# Patient Record
Sex: FEMALE | NOT HISPANIC OR LATINO
[De-identification: names, ages, dates, MRNs, and addresses within clinical notes are randomized per-mention and may not be internally consistent; named-entity substitution may affect disease eponyms.]

---

## 2024-09-04 ENCOUNTER — DASHBOARD ENCOUNTERS (OUTPATIENT)
Age: 34
End: 2024-09-04

## 2024-09-04 ENCOUNTER — LAB OUTSIDE AN ENCOUNTER (OUTPATIENT)
Dept: URBAN - METROPOLITAN AREA CLINIC 113 | Facility: CLINIC | Age: 34
End: 2024-09-04

## 2024-09-04 ENCOUNTER — OFFICE VISIT (OUTPATIENT)
Dept: URBAN - METROPOLITAN AREA CLINIC 113 | Facility: CLINIC | Age: 34
End: 2024-09-04
Payer: COMMERCIAL

## 2024-09-04 VITALS
BODY MASS INDEX: 26.06 KG/M2 | DIASTOLIC BLOOD PRESSURE: 74 MMHG | HEART RATE: 78 BPM | RESPIRATION RATE: 18 BRPM | WEIGHT: 138 LBS | TEMPERATURE: 99.3 F | HEIGHT: 61 IN | SYSTOLIC BLOOD PRESSURE: 111 MMHG

## 2024-09-04 DIAGNOSIS — R10.84 ABDOMINAL PAIN, GENERALIZED: ICD-10-CM

## 2024-09-04 DIAGNOSIS — R93.89 ABNORMAL CT SCAN: ICD-10-CM

## 2024-09-04 DIAGNOSIS — K63.89 OTHER SPECIFIED DISEASES OF INTESTINE: ICD-10-CM

## 2024-09-04 DIAGNOSIS — R19.5 LOOSE STOOLS: ICD-10-CM

## 2024-09-04 PROBLEM — 129679001: Status: ACTIVE | Noted: 2024-09-04

## 2024-09-04 PROCEDURE — 99204 OFFICE O/P NEW MOD 45 MIN: CPT

## 2024-09-04 NOTE — HPI-TODAY'S VISIT:
Ms. Pulido is a 34-year-old woman presenting for evaluation of abdominal pain.  She had a ER visit on 8/17/2024 to [facility] [place] that she described as sharp, burning left lower quadrant pain was like once diarrhea.  She underwent a CT abdomen pelvis without contrast which showed mild colonic wall thickening in the sigmoid colon and rectum suspicious for colitis.  Her labs available for review from this visit showed prior blood cell count 8.23, hemoglobin 12.4 298,000, normal LFTs she was given IV fluids, Dilaudid, Zofran and a dose of Augmentin in the emergency department for treatment of colitis.  Today she reports she completed her course of antibiotics on 8/26. She has never had a colonoscopy. Reports family history of UC in grandfather. Denies family history of colon cancer. Denies hematochezia or melena. She reports she has had these "episodes" for 2 years. Denies unintentional weight loss. She reports she stopped smoking recently. She reports a few months ago she began vomiting for 10 days this has resolved. Denies abdominal pain  today.

## 2024-10-03 ENCOUNTER — OFFICE VISIT (OUTPATIENT)
Dept: URBAN - METROPOLITAN AREA SURGERY CENTER 25 | Facility: SURGERY CENTER | Age: 34
End: 2024-10-03

## 2024-11-01 ENCOUNTER — OFFICE VISIT (OUTPATIENT)
Dept: URBAN - METROPOLITAN AREA CLINIC 113 | Facility: CLINIC | Age: 34
End: 2024-11-01

## 2024-11-04 ENCOUNTER — OFFICE VISIT (OUTPATIENT)
Dept: URBAN - METROPOLITAN AREA SURGERY CENTER 25 | Facility: SURGERY CENTER | Age: 34
End: 2024-11-04

## 2024-12-02 ENCOUNTER — OFFICE VISIT (OUTPATIENT)
Dept: URBAN - METROPOLITAN AREA CLINIC 113 | Facility: CLINIC | Age: 34
End: 2024-12-02